# Patient Record
Sex: FEMALE | Race: WHITE
[De-identification: names, ages, dates, MRNs, and addresses within clinical notes are randomized per-mention and may not be internally consistent; named-entity substitution may affect disease eponyms.]

---

## 2017-05-10 ENCOUNTER — HOSPITAL ENCOUNTER (EMERGENCY)
Dept: HOSPITAL 4 - SED | Age: 42
Discharge: HOME | End: 2017-05-10
Payer: MEDICAID

## 2017-05-10 VITALS — BODY MASS INDEX: 35.08 KG/M2 | WEIGHT: 198 LBS | HEIGHT: 63 IN

## 2017-05-10 VITALS — SYSTOLIC BLOOD PRESSURE: 152 MMHG

## 2017-05-10 DIAGNOSIS — F17.210: ICD-10-CM

## 2017-05-10 DIAGNOSIS — B85.0: ICD-10-CM

## 2017-05-10 DIAGNOSIS — B85.1: Primary | ICD-10-CM

## 2017-05-10 DIAGNOSIS — Z71.6: ICD-10-CM

## 2018-01-11 ENCOUNTER — HOSPITAL ENCOUNTER (EMERGENCY)
Dept: HOSPITAL 4 - SED | Age: 43
Discharge: LEFT BEFORE DISCHARGE | End: 2018-01-11
Payer: MEDICAID

## 2018-01-11 VITALS — WEIGHT: 175 LBS | HEIGHT: 65 IN | BODY MASS INDEX: 29.16 KG/M2

## 2018-01-11 VITALS — SYSTOLIC BLOOD PRESSURE: 126 MMHG

## 2018-01-11 DIAGNOSIS — R20.0: Primary | ICD-10-CM

## 2018-01-11 DIAGNOSIS — Z53.21: ICD-10-CM

## 2018-01-11 NOTE — NUR
Patient called for bed placement three times. Unable to find patient in the ED 
waiting room. Patient LWBS.

## 2018-01-11 NOTE — NUR
Patient triaged and placed in waiting room. VSS and patient appears in no acute 
distress at this time. Awaiting available bed, and MD notified of need for MSE.

## 2018-09-19 ENCOUNTER — HOSPITAL ENCOUNTER (EMERGENCY)
Dept: HOSPITAL 4 - SED | Age: 43
Discharge: HOME | End: 2018-09-19
Payer: MEDICAID

## 2018-09-19 VITALS — WEIGHT: 230 LBS | HEIGHT: 63 IN | BODY MASS INDEX: 40.75 KG/M2

## 2018-09-19 VITALS — SYSTOLIC BLOOD PRESSURE: 159 MMHG

## 2018-09-19 VITALS — SYSTOLIC BLOOD PRESSURE: 163 MMHG
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
## 2018-09-19 DIAGNOSIS — S40.861A: ICD-10-CM
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
## 2018-09-19 DIAGNOSIS — M21.372: ICD-10-CM
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
## 2018-09-19 DIAGNOSIS — S40.862A: Primary | ICD-10-CM
